# Patient Record
Sex: FEMALE | Race: WHITE | HISPANIC OR LATINO | URBAN - METROPOLITAN AREA
[De-identification: names, ages, dates, MRNs, and addresses within clinical notes are randomized per-mention and may not be internally consistent; named-entity substitution may affect disease eponyms.]

---

## 2021-07-07 ENCOUNTER — OFFICE VISIT (OUTPATIENT)
Dept: URBAN - METROPOLITAN AREA CLINIC 63 | Facility: CLINIC | Age: 61
End: 2021-07-07

## 2021-08-06 ENCOUNTER — OFFICE VISIT (OUTPATIENT)
Dept: URBAN - METROPOLITAN AREA SURGERY CENTER 4 | Facility: SURGERY CENTER | Age: 61
End: 2021-08-06

## 2021-09-17 ENCOUNTER — OFFICE VISIT (OUTPATIENT)
Dept: URBAN - METROPOLITAN AREA CLINIC 63 | Facility: CLINIC | Age: 61
End: 2021-09-17

## 2022-06-06 ENCOUNTER — OFFICE VISIT (OUTPATIENT)
Dept: URBAN - METROPOLITAN AREA CLINIC 63 | Facility: CLINIC | Age: 62
End: 2022-06-06

## 2022-06-17 ENCOUNTER — OFFICE VISIT (OUTPATIENT)
Dept: URBAN - METROPOLITAN AREA SURGERY CENTER 4 | Facility: SURGERY CENTER | Age: 62
End: 2022-06-17

## 2022-07-09 ENCOUNTER — TELEPHONE ENCOUNTER (OUTPATIENT)
Dept: URBAN - METROPOLITAN AREA CLINIC 121 | Facility: CLINIC | Age: 62
End: 2022-07-09

## 2022-07-09 RX ORDER — ERGOCALCIFEROL 1.25 MG/1
CAPSULE ORAL TAKE AS DIRECTED
Refills: 0 | OUTPATIENT
Start: 2021-05-13 | End: 2022-06-06

## 2022-07-09 RX ORDER — LETROZOLE TABLETS 2.5 MG/1
TABLET, FILM COATED ORAL TAKE AS DIRECTED
Refills: 0 | OUTPATIENT
Start: 2021-05-13 | End: 2022-06-06

## 2022-07-09 RX ORDER — HYDROCORTISONE 2.5% 25 MG/G
THREE TIMES A DAY CREAM TOPICAL THREE TIMES A DAY
Refills: 0 | OUTPATIENT
Start: 2021-07-07 | End: 2022-06-06

## 2022-07-09 RX ORDER — CYANOCOBALAMIN 1000 UG/ML
INJECTION INTRAMUSCULAR; SUBCUTANEOUS TAKE AS DIRECTED
Refills: 0 | OUTPATIENT
Start: 2021-05-13 | End: 2022-06-06

## 2022-07-10 ENCOUNTER — TELEPHONE ENCOUNTER (OUTPATIENT)
Dept: URBAN - METROPOLITAN AREA CLINIC 121 | Facility: CLINIC | Age: 62
End: 2022-07-10

## 2022-07-10 RX ORDER — LETROZOLE TABLETS 2.5 MG/1
TABLET, FILM COATED ORAL ONCE A DAY
Refills: 0 | Status: ACTIVE | COMMUNITY
Start: 2022-06-06

## 2022-07-10 RX ORDER — OMEPRAZOLE 20 MG/1
ONCE A DAY CAPSULE, DELAYED RELEASE ORAL ONCE A DAY
Refills: 0 | Status: ACTIVE | COMMUNITY
Start: 2022-06-06

## 2022-07-10 RX ORDER — SUCRALFATE 1 G/1
TWICE A DAY TABLET ORAL TWICE A DAY
Refills: 0 | Status: ACTIVE | COMMUNITY
Start: 2022-06-06

## 2022-07-29 ENCOUNTER — CLAIMS CREATED FROM THE CLAIM WINDOW (OUTPATIENT)
Dept: URBAN - METROPOLITAN AREA CLINIC 4 | Facility: CLINIC | Age: 62
End: 2022-07-29
Payer: COMMERCIAL

## 2022-07-29 ENCOUNTER — CLAIMS CREATED FROM THE CLAIM WINDOW (OUTPATIENT)
Dept: URBAN - METROPOLITAN AREA SURGERY CENTER 4 | Facility: SURGERY CENTER | Age: 62
End: 2022-07-29
Payer: COMMERCIAL

## 2022-07-29 DIAGNOSIS — K31.89 OTHER DISEASES OF STOMACH AND DUODENUM: ICD-10-CM

## 2022-07-29 DIAGNOSIS — K21.9 GASTRO-ESOPHAGEAL REFLUX DISEASE WITHOUT ESOPHAGITIS: ICD-10-CM

## 2022-07-29 DIAGNOSIS — K31.7 POLYP OF STOMACH AND DUODENUM: ICD-10-CM

## 2022-07-29 DIAGNOSIS — R19.8 OTHER SPECIFIED SYMPTOMS AND SIGNS INVOLVING THE DIGESTIVE SYSTEM AND ABDOMEN: ICD-10-CM

## 2022-07-29 DIAGNOSIS — K22.70 BARRETT'S ESOPHAGUS WITHOUT DYSPLASIA: ICD-10-CM

## 2022-07-29 DIAGNOSIS — K29.50 UNSPECIFIED CHRONIC GASTRITIS WITHOUT BLEEDING: ICD-10-CM

## 2022-07-29 PROCEDURE — 88312 SPECIAL STAINS GROUP 1: CPT | Performed by: PATHOLOGY

## 2022-07-29 PROCEDURE — 43239 EGD BIOPSY SINGLE/MULTIPLE: CPT | Performed by: INTERNAL MEDICINE

## 2022-07-29 PROCEDURE — 88305 TISSUE EXAM BY PATHOLOGIST: CPT | Performed by: PATHOLOGY

## 2022-07-29 PROCEDURE — G8918 PT W/O PREOP ORDER IV AB PRO: HCPCS | Performed by: INTERNAL MEDICINE

## 2022-07-29 PROCEDURE — G8907 PT DOC NO EVENTS ON DISCHARG: HCPCS | Performed by: INTERNAL MEDICINE

## 2022-08-05 PROBLEM — 302914006 BARRETT'S ESOPHAGUS: Status: ACTIVE | Noted: 2022-08-05

## 2022-08-05 PROBLEM — 92411005 BENIGN NEOPLASM OF STOMACH: Status: ACTIVE | Noted: 2022-08-05

## 2022-08-05 PROBLEM — 84568007 ATROPHIC GASTRITIS: Status: ACTIVE | Noted: 2022-08-05

## 2022-08-29 ENCOUNTER — OFFICE VISIT (OUTPATIENT)
Dept: URBAN - METROPOLITAN AREA CLINIC 63 | Facility: CLINIC | Age: 62
End: 2022-08-29

## 2022-09-28 ENCOUNTER — OFFICE VISIT (OUTPATIENT)
Dept: URBAN - METROPOLITAN AREA CLINIC 60 | Facility: CLINIC | Age: 62
End: 2022-09-28

## 2022-10-28 ENCOUNTER — OFFICE VISIT (OUTPATIENT)
Dept: URBAN - METROPOLITAN AREA CLINIC 60 | Facility: CLINIC | Age: 62
End: 2022-10-28
Payer: COMMERCIAL

## 2022-10-28 VITALS
SYSTOLIC BLOOD PRESSURE: 118 MMHG | BODY MASS INDEX: 24.59 KG/M2 | OXYGEN SATURATION: 97 % | TEMPERATURE: 97.8 F | DIASTOLIC BLOOD PRESSURE: 76 MMHG | HEART RATE: 79 BPM | WEIGHT: 153 LBS | HEIGHT: 66 IN

## 2022-10-28 DIAGNOSIS — K44.9 HIATAL HERNIA: ICD-10-CM

## 2022-10-28 DIAGNOSIS — K29.50 CHRONIC GASTRITIS WITHOUT BLEEDING, UNSPECIFIED GASTRITIS TYPE: ICD-10-CM

## 2022-10-28 DIAGNOSIS — K21.9 GASTROESOPHAGEAL REFLUX DISEASE WITHOUT ESOPHAGITIS: ICD-10-CM

## 2022-10-28 PROCEDURE — 99214 OFFICE O/P EST MOD 30 MIN: CPT | Performed by: NURSE PRACTITIONER

## 2022-10-28 RX ORDER — LETROZOLE TABLETS 2.5 MG/1
TABLET, FILM COATED ORAL ONCE A DAY
Refills: 0 | Status: ACTIVE | COMMUNITY
Start: 2022-06-06

## 2022-10-28 RX ORDER — SUCRALFATE 1 G/1
1 TABLET ON AN EMPTY STOMACH TABLET ORAL TWICE A DAY
Qty: 90 TABLET | Refills: 0 | OUTPATIENT
Start: 2022-10-28 | End: 2023-01-28

## 2022-10-28 RX ORDER — ESOMEPRAZOLE MAGNESIUM 40 MG/1
1 CAPSULE CAPSULE, DELAYED RELEASE ORAL ONCE A DAY
Qty: 180 CAPSULE | Refills: 0 | OUTPATIENT
Start: 2022-10-28

## 2022-10-28 RX ORDER — OMEPRAZOLE 20 MG/1
ONCE A DAY CAPSULE, DELAYED RELEASE ORAL ONCE A DAY
Refills: 0 | Status: ACTIVE | COMMUNITY
Start: 2022-06-06

## 2022-10-28 NOTE — PHYSICAL EXAM MUSCULOSKELETAL:
Normal gait and station , no tenderness or deformities present. Upper and lower extremities normal. Tiffany Garcia  (RN)  2019 02:10:02

## 2022-10-28 NOTE — HPI-HPI
7/21 Patient comes for screening colonscopy, with personal history of hemorrhoids, with change in her bowel associated to cholecystectomy. Last colonoscopy 13 years in [place] as per patient only had hemorrhoids. Will start topical cream. and will plan a colonoscopy. Discussed the need for high fiber diet and adequate hydration.  Information sheet reviewed a copy provided. Discussed dietary restrictions pertaining to Gastritis Information sheet reviewed and a copy provided. Discussed anti-reflux diet and precautions.  Information sheet reviewed and a copy provided. Discussed the need for appropriate follow-up care.  Patient will be placed in our recall system and a letter will be mailed to the patient. Discussed with patient the importance of remaining  upright after eating, avoid tight clothing, reduce stress, and elevate the head of the bed.   6/22 Patient here today complaining of having symptoms of gastritis and reflux.  The symptoms are chronic but now seems to be worse. Plan: Patient will do diet for gastritis and reflux, a trial with omeprazole 20 mg a day and Carafate 1 g twice a day. EGD. 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  Small hiatal hernia, chronic gastritis, and a few polyps.  Biopsy results demonstrated no significant abnormality of the duodenal and stomach antrum and body biopsy.  Stomach fundic gland polyps.  Lower third esophageal mucosa with reflux type changes no Sanders's esophagus, dysplasia, or malignancy.  Today patient has good general state she is still complaining of symptoms of reflux.  She will resume diet and treatment with PPI and Carafate.

## 2022-11-14 ENCOUNTER — APPOINTMENT (RX ONLY)
Dept: URBAN - METROPOLITAN AREA CLINIC 334 | Facility: CLINIC | Age: 62
Setting detail: DERMATOLOGY
End: 2022-11-14

## 2022-11-14 DIAGNOSIS — L20.89 OTHER ATOPIC DERMATITIS: ICD-10-CM | Status: INADEQUATELY CONTROLLED

## 2022-11-14 DIAGNOSIS — Z41.9 ENCOUNTER FOR PROCEDURE FOR PURPOSES OTHER THAN REMEDYING HEALTH STATE, UNSPECIFIED: ICD-10-CM

## 2022-11-14 PROCEDURE — ? JUVEDERM ULTRA PLUS XC INJECTION

## 2022-11-14 PROCEDURE — ? RECOMMENDATIONS

## 2022-11-14 PROCEDURE — ? ADDITIONAL NOTES

## 2022-11-14 PROCEDURE — 99204 OFFICE O/P NEW MOD 45 MIN: CPT

## 2022-11-14 PROCEDURE — ? PRESCRIPTION

## 2022-11-14 PROCEDURE — ? COUNSELING

## 2022-11-14 RX ORDER — RUXOLITINIB 15 MG/G
CREAM TOPICAL
Qty: 60 | Refills: 2 | Status: ERX | COMMUNITY
Start: 2022-11-14

## 2022-11-14 RX ORDER — CLOBETASOL PROPIONATE 0.5 MG/G
OINTMENT TOPICAL
Qty: 45 | Refills: 2 | Status: ERX | COMMUNITY
Start: 2022-11-14

## 2022-11-14 RX ADMIN — CLOBETASOL PROPIONATE: 0.5 OINTMENT TOPICAL at 00:00

## 2022-11-14 RX ADMIN — RUXOLITINIB: 15 CREAM TOPICAL at 00:00

## 2022-11-14 ASSESSMENT — LOCATION DETAILED DESCRIPTION DERM
LOCATION DETAILED: RIGHT CENTRAL BUCCAL CHEEK
LOCATION DETAILED: LEFT CENTRAL BUCCAL CHEEK
LOCATION DETAILED: LEFT DISTAL LATERAL PRETIBIAL REGION
LOCATION DETAILED: RIGHT LATERAL DISTAL CALF
LOCATION DETAILED: LEFT DISTAL POSTERIOR UPPER ARM
LOCATION DETAILED: LEFT ANTERIOR DISTAL THIGH
LOCATION DETAILED: RIGHT DISTAL POSTERIOR UPPER ARM

## 2022-11-14 ASSESSMENT — LOCATION ZONE DERM
LOCATION ZONE: FACE
LOCATION ZONE: LEG
LOCATION ZONE: ARM

## 2022-11-14 ASSESSMENT — LOCATION SIMPLE DESCRIPTION DERM
LOCATION SIMPLE: LEFT THIGH
LOCATION SIMPLE: LEFT LOWER LEG
LOCATION SIMPLE: RIGHT CHEEK
LOCATION SIMPLE: LEFT CHEEK
LOCATION SIMPLE: RIGHT POSTERIOR UPPER ARM
LOCATION SIMPLE: LEFT POSTERIOR UPPER ARM
LOCATION SIMPLE: RIGHT LOWER LEG

## 2022-11-14 ASSESSMENT — BSA ECZEMA: % BODY COVERED IN ECZEMA: 6

## 2022-11-14 NOTE — PROCEDURE: JUVEDERM ULTRA PLUS XC INJECTION
Price (Use Numbers Only, No Special Characters Or $): 801 Price (Use Numbers Only, No Special Characters Or $): 008

## 2022-11-14 NOTE — PROCEDURE: ADDITIONAL NOTES
Render Risk Assessment In Note?: no
Additional Notes: Treatment goal is a significant reduction in erythematous scaly patches and flares.\\n\\nPatient reports having biopsy done by PCP, which results came back as Eczema. Patient is currently using triamcinolone cream. Patient filling out medical release form for us to receive results.
Detail Level: Simple

## 2022-11-14 NOTE — PROCEDURE: COUNSELING
Antihistamine Recommendations: I recommend OTC Claritin or Zyrtec to help with itchiness.
Detail Level: Simple

## 2022-11-14 NOTE — PROCEDURE: RECOMMENDATIONS
Render Risk Assessment In Note?: no
Detail Level: Zone
Recommendation Preamble: The following recommendations were made during the visit: Cerave or cetaphil wash and moisturizer. All Free & Clear laundry detergent.

## 2022-11-28 ENCOUNTER — APPOINTMENT (RX ONLY)
Dept: URBAN - METROPOLITAN AREA CLINIC 334 | Facility: CLINIC | Age: 62
Setting detail: DERMATOLOGY
End: 2022-11-28

## 2022-11-28 DIAGNOSIS — L20.89 OTHER ATOPIC DERMATITIS: ICD-10-CM | Status: IMPROVED

## 2022-11-28 DIAGNOSIS — L98.8 OTHER SPECIFIED DISORDERS OF THE SKIN AND SUBCUTANEOUS TISSUE: ICD-10-CM

## 2022-11-28 PROCEDURE — ? ADDITIONAL NOTES

## 2022-11-28 PROCEDURE — 99214 OFFICE O/P EST MOD 30 MIN: CPT

## 2022-11-28 PROCEDURE — ? PRESCRIPTION

## 2022-11-28 PROCEDURE — ? COUNSELING

## 2022-11-28 RX ORDER — RUXOLITINIB 15 MG/G
CREAM TOPICAL
Qty: 60 | Refills: 0 | Status: ACTIVE

## 2022-11-28 ASSESSMENT — LOCATION DETAILED DESCRIPTION DERM
LOCATION DETAILED: LEFT DISTAL LATERAL PRETIBIAL REGION
LOCATION DETAILED: RIGHT LATERAL DISTAL CALF
LOCATION DETAILED: LEFT DISTAL POSTERIOR UPPER ARM
LOCATION DETAILED: LEFT ANTERIOR DISTAL THIGH
LOCATION DETAILED: RIGHT DISTAL POSTERIOR UPPER ARM

## 2022-11-28 ASSESSMENT — LOCATION ZONE DERM
LOCATION ZONE: LEG
LOCATION ZONE: ARM

## 2022-11-28 ASSESSMENT — LOCATION SIMPLE DESCRIPTION DERM
LOCATION SIMPLE: LEFT POSTERIOR UPPER ARM
LOCATION SIMPLE: RIGHT LOWER LEG
LOCATION SIMPLE: LEFT LOWER LEG
LOCATION SIMPLE: RIGHT POSTERIOR UPPER ARM
LOCATION SIMPLE: LEFT THIGH

## 2022-11-28 NOTE — PROCEDURE: ADDITIONAL NOTES
Additional Notes: Reassured guest regarding her filler.
Detail Level: Simple
Render Risk Assessment In Note?: no

## 2023-01-18 ENCOUNTER — OFFICE VISIT (OUTPATIENT)
Dept: URBAN - METROPOLITAN AREA CLINIC 60 | Facility: CLINIC | Age: 63
End: 2023-01-18
Payer: COMMERCIAL

## 2023-01-18 ENCOUNTER — DASHBOARD ENCOUNTERS (OUTPATIENT)
Age: 63
End: 2023-01-18

## 2023-01-18 VITALS
RESPIRATION RATE: 20 BRPM | OXYGEN SATURATION: 98 % | HEIGHT: 66 IN | HEART RATE: 77 BPM | BODY MASS INDEX: 24.91 KG/M2 | SYSTOLIC BLOOD PRESSURE: 120 MMHG | TEMPERATURE: 97.6 F | WEIGHT: 155 LBS | DIASTOLIC BLOOD PRESSURE: 78 MMHG

## 2023-01-18 DIAGNOSIS — K29.50 CHRONIC GASTRITIS WITHOUT BLEEDING, UNSPECIFIED GASTRITIS TYPE: ICD-10-CM

## 2023-01-18 DIAGNOSIS — K21.9 GASTROESOPHAGEAL REFLUX DISEASE WITHOUT ESOPHAGITIS: ICD-10-CM

## 2023-01-18 DIAGNOSIS — K44.9 HIATAL HERNIA: ICD-10-CM

## 2023-01-18 PROBLEM — 8493009: Status: ACTIVE | Noted: 2022-10-31

## 2023-01-18 PROBLEM — 266435005: Status: ACTIVE | Noted: 2022-10-28

## 2023-01-18 PROCEDURE — 99214 OFFICE O/P EST MOD 30 MIN: CPT | Performed by: NURSE PRACTITIONER

## 2023-01-18 RX ORDER — LETROZOLE TABLETS 2.5 MG/1
TABLET, FILM COATED ORAL ONCE A DAY
Refills: 0 | Status: ACTIVE | COMMUNITY
Start: 2022-06-06

## 2023-01-18 RX ORDER — ESOMEPRAZOLE MAGNESIUM 40 MG/1
1 CAPSULE CAPSULE, DELAYED RELEASE ORAL ONCE A DAY
Qty: 180 CAPSULE | Refills: 0 | OUTPATIENT

## 2023-01-18 RX ORDER — FAMOTIDINE 20 MG/1
1 TABLET AT BEDTIME AS NEEDED TABLET, FILM COATED ORAL ONCE A DAY
Qty: 90 TABLETS | Refills: 0 | OUTPATIENT
Start: 2023-01-18

## 2023-01-18 RX ORDER — ESOMEPRAZOLE MAGNESIUM 40 MG/1
1 CAPSULE CAPSULE, DELAYED RELEASE ORAL ONCE A DAY
Qty: 180 CAPSULE | Refills: 0 | Status: ACTIVE | COMMUNITY
Start: 2022-10-28

## 2023-01-18 RX ORDER — SUCRALFATE 1 G/1
1 TABLET ON AN EMPTY STOMACH TABLET ORAL TWICE A DAY
Qty: 90 TABLET | Refills: 0 | Status: ACTIVE | COMMUNITY
Start: 2022-10-28 | End: 2023-01-28

## 2023-01-18 NOTE — HPI-HPI
7/21 Patient comes for screening colonoscopy, with personal history of hemorrhoids, with change in her bowel associated to cholecystectomy. Last colonoscopy 13 years in [place] as per patient only had hemorrhoids. Will start topical cream. And will plan a colonoscopy. Discussed the need for high fiber diet and adequate hydration.  Information sheet reviewed a copy provided. Discussed dietary restrictions pertaining to Gastritis Information sheet reviewed and a copy provided. Discussed anti-reflux diet and precautions.  Information sheet reviewed and a copy provided. Discussed the need for appropriate follow-up care.  Patient will be placed in our recall system and a letter will be mailed to the patient. Discussed with patient the importance of remaining  upright after eating, avoid tight clothing, reduce stress, and elevate the head of the bed.   6/22 Patient here today complaining of having symptoms of gastritis and reflux.  The symptoms are chronic but now seems to be worse. Plan: Patient will do diet for gastritis and reflux, a trial with omeprazole 20 mg a day and Carafate 1 g twice a day. EGD. 10/22 EGD shows evidence of esophageal mucosal changes suspicious for short segment of Sandres esophagus.  Small hiatal hernia, chronic gastritis, and a few polyps.  Biopsy results demonstrated no significant abnormality of the duodenal and stomach antrum and body biopsy.  Stomach fundic gland polyps.  Lower third esophageal mucosa with reflux type changes no Sanders's esophagus, dysplasia, or malignancy.  Today patient has good general state she is still complaining of symptoms of reflux.  She will resume diet and treatment with PPI and Carafate. 1/23 Patient is here today in good general state.  She says symptoms of gastritis and reflux are better, but still having some symptoms the symptoms are mainly related with meal intake.  Patient will continue with diet, PPI and famotidine. DC Carafate.

## 2023-01-18 NOTE — PHYSICAL EXAM CHEST:
Called Dr. Phelps again to let him know that the pt. Would like other company to be contacted for home PT order, MD stated to write a note that pt. Needs home PT and to contact him on Monday if they have any questions. Also MD mentioned that to fax the operation note to the fax that pt. Provide, The writer followed the recommendation and wrote the note and sent the fax at 5:30pm   No lesions,  no deformities, chest wall non-tender,  no masses present, breathing is unlabored without accessory muscle use, normal breath sounds

## 2024-01-17 ENCOUNTER — TELEPHONE ENCOUNTER (OUTPATIENT)
Dept: URBAN - METROPOLITAN AREA CLINIC 63 | Facility: CLINIC | Age: 64
End: 2024-01-17

## 2024-01-17 RX ORDER — LIDOCAINE HYDROCHLORIDE AND HYDROCORTISONE ACETATE 30; 25 MG/G; MG/G
1 APPLICATORFUL GEL RECTAL TWICE A DAY
Qty: 60 GM | Refills: 1 | OUTPATIENT
Start: 2024-01-18 | End: 2024-03-18

## 2024-03-20 ENCOUNTER — OV EP (OUTPATIENT)
Dept: URBAN - METROPOLITAN AREA CLINIC 60 | Facility: CLINIC | Age: 64
End: 2024-03-20